# Patient Record
Sex: MALE | Race: WHITE | Employment: FULL TIME | URBAN - NONMETROPOLITAN AREA
[De-identification: names, ages, dates, MRNs, and addresses within clinical notes are randomized per-mention and may not be internally consistent; named-entity substitution may affect disease eponyms.]

---

## 2023-12-06 ENCOUNTER — OFFICE VISIT (OUTPATIENT)
Age: 68
End: 2023-12-06
Payer: COMMERCIAL

## 2023-12-06 VITALS
SYSTOLIC BLOOD PRESSURE: 130 MMHG | HEIGHT: 74 IN | HEART RATE: 87 BPM | WEIGHT: 186.8 LBS | BODY MASS INDEX: 23.97 KG/M2 | DIASTOLIC BLOOD PRESSURE: 62 MMHG

## 2023-12-06 DIAGNOSIS — Z79.4 TYPE 2 DIABETES MELLITUS WITH HYPERGLYCEMIA, WITH LONG-TERM CURRENT USE OF INSULIN (HCC): Primary | ICD-10-CM

## 2023-12-06 DIAGNOSIS — E11.65 TYPE 2 DIABETES MELLITUS WITH HYPERGLYCEMIA, WITH LONG-TERM CURRENT USE OF INSULIN (HCC): Primary | ICD-10-CM

## 2023-12-06 DIAGNOSIS — E78.2 MIXED HYPERLIPIDEMIA: ICD-10-CM

## 2023-12-06 PROCEDURE — 1123F ACP DISCUSS/DSCN MKR DOCD: CPT | Performed by: INTERNAL MEDICINE

## 2023-12-06 PROCEDURE — 99204 OFFICE O/P NEW MOD 45 MIN: CPT | Performed by: INTERNAL MEDICINE

## 2023-12-06 RX ORDER — FAMOTIDINE 40 MG/1
40 TABLET, FILM COATED ORAL
COMMUNITY
Start: 2023-02-17

## 2023-12-06 RX ORDER — EVOLOCUMAB 140 MG/ML
140 INJECTION, SOLUTION SUBCUTANEOUS
COMMUNITY
Start: 2023-07-24

## 2023-12-06 RX ORDER — PRASUGREL 10 MG/1
10 TABLET, FILM COATED ORAL DAILY
COMMUNITY
Start: 2023-11-22

## 2023-12-06 RX ORDER — TADALAFIL 5 MG/1
TABLET ORAL
COMMUNITY
Start: 2023-06-19

## 2023-12-06 RX ORDER — EMPAGLIFLOZIN 25 MG/1
25 TABLET, FILM COATED ORAL DAILY
COMMUNITY
Start: 2023-11-10

## 2023-12-06 RX ORDER — FLUTICASONE PROPIONATE 50 MCG
2 SPRAY, SUSPENSION (ML) NASAL DAILY PRN
COMMUNITY
Start: 2023-02-17

## 2023-12-06 NOTE — PROGRESS NOTES
Doris Bruner is a 77 y.o. , male who comes for Initial visit for Diabetes Mellitus Type 2  PCP: Anibal Wang RN    HPI   This patient was diagnosed with diabetes mellitus 13 years ago. Current therapy includes LANTUS 30 units  AND metformin 1000/1000 and jardiance 25 mg daily    Nutritional plan:  portion control and restriction of carbs  Exercise Plan  cardiac rehab at the moment. Usually from ADLs. The patient is checking blood sugar 4x times/day using dexom CGMS. His average blood glucose is 145 mg/dL with a glucose management indicator of 6.8%. The patient was within the target range 76% of the time. Hypoglycemia is extremely uncommon. The patient does recognize hypoglycemia. There is no history of severe hypoglycemia. Most recent A1c is 6.6 from 6 weeks ago. Diabetic symptoms: Polyuria   He is not current on eye exam, and He reports no foot ulcers or infections. He is s/p fusion of left PTP joint  Patient isbeing treated for hypercholesterolemia. Patient is being treated for  hypertension. Past Medical History:   Diagnosis Date    Diabetes mellitus ([address])     Pancreatitis       History reviewed. No pertinent surgical history. History reviewed. No pertinent family history. Social History     Tobacco Use    Smoking status: Former     Packs/day: 1     Types: Cigarettes     Quit date: 8/21/2013     Years since quitting: 10.2    Smokeless tobacco: Never   Substance Use Topics    Alcohol use: Yes     Comment: Socially      Current Outpatient Medications   Medication Sig Dispense Refill    Evolocumab (REPATHA SURECLICK) 272 MG/ML SOAJ Inject 140 mg into the skin every 14 days      famotidine (PEPCID) 40 MG tablet Take 1 tablet by mouth      fluticasone (FLONASE) 50 MCG/ACT nasal spray 2 sprays by Nasal route daily as needed      tadalafil (CIALIS) 5 MG tablet Take 1 tab (5 mg) twice daily.       JARDIANCE 25 MG tablet Take 1 tablet by mouth daily      prasugrel (EFFIENT) 10 MG TABS Take 1

## 2024-02-22 ENCOUNTER — HOSPITAL ENCOUNTER (EMERGENCY)
Age: 69
Discharge: HOME | End: 2024-02-22
Attending: FAMILY MEDICINE
Payer: OTHER GOVERNMENT

## 2024-02-22 VITALS
WEIGHT: 185 LBS | TEMPERATURE: 97.6 F | HEIGHT: 74 IN | HEART RATE: 62 BPM | DIASTOLIC BLOOD PRESSURE: 82 MMHG | RESPIRATION RATE: 16 BRPM | SYSTOLIC BLOOD PRESSURE: 130 MMHG | BODY MASS INDEX: 23.74 KG/M2 | OXYGEN SATURATION: 98 %

## 2024-02-22 DIAGNOSIS — M54.2 NECK PAIN: Primary | ICD-10-CM

## 2024-02-22 DIAGNOSIS — M25.519 ACUTE SHOULDER PAIN, UNSPECIFIED LATERALITY: ICD-10-CM

## 2024-02-22 LAB
ANION GAP SERPL CALC-SCNC: 14 MEQ/L (ref 8–16)
BASOPHILS ABSOLUTE: 0 THOU/MM3 (ref 0–0.1)
BASOPHILS NFR BLD AUTO: 0.7 %
BUN SERPL-MCNC: 15 MG/DL (ref 7–22)
CALCIUM SERPL-MCNC: 10 MG/DL (ref 8.5–10.5)
CHLORIDE SERPL-SCNC: 98 MEQ/L (ref 98–111)
CO2 SERPL-SCNC: 26 MEQ/L (ref 23–33)
CREAT SERPL-MCNC: 0.7 MG/DL (ref 0.4–1.2)
DEPRECATED RDW RBC AUTO: 41.2 FL (ref 35–45)
EKG ATRIAL RATE: 64 BPM
EKG P AXIS: 56 DEGREES
EKG P-R INTERVAL: 178 MS
EKG Q-T INTERVAL: 414 MS
EKG QRS DURATION: 96 MS
EKG QTC CALCULATION (BAZETT): 427 MS
EKG R AXIS: -24 DEGREES
EKG T AXIS: 16 DEGREES
EKG VENTRICULAR RATE: 64 BPM
EOSINOPHIL NFR BLD AUTO: 3.7 %
EOSINOPHILS ABSOLUTE: 0.2 THOU/MM3 (ref 0–0.4)
ERYTHROCYTE [DISTWIDTH] IN BLOOD BY AUTOMATED COUNT: 11.7 % (ref 11.5–14.5)
GFR SERPL CREATININE-BSD FRML MDRD: > 60 ML/MIN/1.73M2
GLUCOSE SERPL-MCNC: 139 MG/DL (ref 70–108)
HCT VFR BLD AUTO: 40.5 % (ref 42–52)
HGB BLD-MCNC: 13 GM/DL (ref 14–18)
IMM GRANULOCYTES # BLD AUTO: 0.01 THOU/MM3 (ref 0–0.07)
IMM GRANULOCYTES NFR BLD AUTO: 0.2 %
LYMPHOCYTES ABSOLUTE: 0.9 THOU/MM3 (ref 1–4.8)
LYMPHOCYTES NFR BLD AUTO: 16.5 %
MCH RBC QN AUTO: 30.9 PG (ref 26–33)
MCHC RBC AUTO-ENTMCNC: 32.1 GM/DL (ref 32.2–35.5)
MCV RBC AUTO: 96.2 FL (ref 80–94)
MONOCYTES ABSOLUTE: 0.6 THOU/MM3 (ref 0.4–1.3)
MONOCYTES NFR BLD AUTO: 9.8 %
NEUTROPHILS NFR BLD AUTO: 69.1 %
NRBC BLD AUTO-RTO: 0 /100 WBC
OSMOLALITY SERPL CALC.SUM OF ELEC: 278.8 MOSMOL/KG (ref 275–300)
PLATELET # BLD AUTO: 236 THOU/MM3 (ref 130–400)
PMV BLD AUTO: 8.7 FL (ref 9.4–12.4)
POTASSIUM SERPL-SCNC: 4 MEQ/L (ref 3.5–5.2)
RBC # BLD AUTO: 4.21 MILL/MM3 (ref 4.7–6.1)
SEGMENTED NEUTROPHILS ABSOLUTE COUNT: 3.9 THOU/MM3 (ref 1.8–7.7)
SODIUM SERPL-SCNC: 138 MEQ/L (ref 135–145)
TROPONIN, HIGH SENSITIVITY: 12 NG/L (ref 0–12)
TROPONIN, HIGH SENSITIVITY: 14 NG/L (ref 0–12)
WBC # BLD AUTO: 5.7 THOU/MM3 (ref 4.8–10.8)

## 2024-02-22 PROCEDURE — 99284 EMERGENCY DEPT VISIT MOD MDM: CPT

## 2024-02-22 PROCEDURE — 85025 COMPLETE CBC W/AUTO DIFF WBC: CPT

## 2024-02-22 PROCEDURE — 93005 ELECTROCARDIOGRAM TRACING: CPT | Performed by: STUDENT IN AN ORGANIZED HEALTH CARE EDUCATION/TRAINING PROGRAM

## 2024-02-22 PROCEDURE — 96372 THER/PROPH/DIAG INJ SC/IM: CPT

## 2024-02-22 PROCEDURE — 84484 ASSAY OF TROPONIN QUANT: CPT

## 2024-02-22 PROCEDURE — 6360000002 HC RX W HCPCS: Performed by: STUDENT IN AN ORGANIZED HEALTH CARE EDUCATION/TRAINING PROGRAM

## 2024-02-22 PROCEDURE — 96374 THER/PROPH/DIAG INJ IV PUSH: CPT

## 2024-02-22 PROCEDURE — 80048 BASIC METABOLIC PNL TOTAL CA: CPT

## 2024-02-22 PROCEDURE — 36415 COLL VENOUS BLD VENIPUNCTURE: CPT

## 2024-02-22 RX ORDER — ORPHENADRINE CITRATE 30 MG/ML
60 INJECTION INTRAMUSCULAR; INTRAVENOUS ONCE
Status: COMPLETED | OUTPATIENT
Start: 2024-02-22 | End: 2024-02-22

## 2024-02-22 RX ORDER — METHYLPREDNISOLONE 4 MG/1
TABLET ORAL
Qty: 1 KIT | Refills: 0 | Status: SHIPPED | OUTPATIENT
Start: 2024-02-22 | End: 2024-02-28

## 2024-02-22 RX ADMIN — ORPHENADRINE CITRATE 60 MG: 60 INJECTION INTRAMUSCULAR; INTRAVENOUS at 07:08

## 2024-02-22 RX ADMIN — HYDROMORPHONE HYDROCHLORIDE 1 MG: 1 INJECTION, SOLUTION INTRAMUSCULAR; INTRAVENOUS; SUBCUTANEOUS at 07:08

## 2024-02-22 ASSESSMENT — PAIN - FUNCTIONAL ASSESSMENT
PAIN_FUNCTIONAL_ASSESSMENT: 0-10

## 2024-02-22 ASSESSMENT — PAIN DESCRIPTION - ORIENTATION
ORIENTATION: LEFT
ORIENTATION: LEFT

## 2024-02-22 ASSESSMENT — PAIN SCALES - GENERAL
PAINLEVEL_OUTOF10: 6
PAINLEVEL_OUTOF10: 10
PAINLEVEL_OUTOF10: 10
PAINLEVEL_OUTOF10: 7

## 2024-02-22 ASSESSMENT — PAIN DESCRIPTION - LOCATION
LOCATION: NECK;SHOULDER
LOCATION: SHOULDER;NECK
LOCATION: NECK

## 2024-02-22 ASSESSMENT — PAIN DESCRIPTION - FREQUENCY: FREQUENCY: CONTINUOUS

## 2024-02-22 NOTE — ED NOTES
Pt reassessed at this time. Rates pain 6/10 at this time. Vitals stable with telemetry in place. Call light in reach
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
## 2024-02-22 NOTE — DISCHARGE INSTRUCTIONS
You are seen today in the emergency department for left-sided shoulder pain.    Your cardiac workup was negative.  Your pain most likely secondary to your recent surgery as well as trauma.    We are sending you home on a Medrol Dosepak to help with inflammation and pain.    Follow-up with your surgeon regarding today visit.

## 2024-03-20 ENCOUNTER — CLINICAL DOCUMENTATION (OUTPATIENT)
Age: 69
End: 2024-03-20

## 2024-03-20 ENCOUNTER — TELEPHONE (OUTPATIENT)
Age: 69
End: 2024-03-20

## 2024-03-20 DIAGNOSIS — Z79.4 TYPE 2 DIABETES MELLITUS WITH HYPERGLYCEMIA, WITH LONG-TERM CURRENT USE OF INSULIN (HCC): Primary | ICD-10-CM

## 2024-03-20 DIAGNOSIS — Z79.4 TYPE 2 DIABETES MELLITUS WITH HYPERGLYCEMIA, WITH LONG-TERM CURRENT USE OF INSULIN (HCC): ICD-10-CM

## 2024-03-20 DIAGNOSIS — E11.65 TYPE 2 DIABETES MELLITUS WITH HYPERGLYCEMIA, WITH LONG-TERM CURRENT USE OF INSULIN (HCC): ICD-10-CM

## 2024-03-20 DIAGNOSIS — E11.65 TYPE 2 DIABETES MELLITUS WITH HYPERGLYCEMIA, WITH LONG-TERM CURRENT USE OF INSULIN (HCC): Primary | ICD-10-CM

## 2024-03-20 RX ORDER — INSULIN LISPRO 100 [IU]/ML
INJECTION, SOLUTION INTRAVENOUS; SUBCUTANEOUS
Qty: 5 ADJUSTABLE DOSE PRE-FILLED PEN SYRINGE | Refills: 5 | Status: SHIPPED | OUTPATIENT
Start: 2024-03-20 | End: 2024-03-20 | Stop reason: CLARIF

## 2024-03-20 RX ORDER — ACYCLOVIR 400 MG/1
TABLET ORAL
Qty: 9 EACH | Refills: 3 | Status: SHIPPED | OUTPATIENT
Start: 2024-03-20

## 2024-03-20 RX ORDER — INSULIN ASPART 100 [IU]/ML
INJECTION, SOLUTION INTRAVENOUS; SUBCUTANEOUS
Qty: 5 ADJUSTABLE DOSE PRE-FILLED PEN SYRINGE | Refills: 5 | Status: SHIPPED | OUTPATIENT
Start: 2024-03-20 | End: 2024-03-21 | Stop reason: SDUPTHER

## 2024-03-20 NOTE — PROGRESS NOTES
I sent in prescriptions for insulin and pen needles as well as sensors.  [facility] [place]

## 2024-03-20 NOTE — TELEPHONE ENCOUNTER
[facility] called in and they do not have humalog and would like to know if this can be changed to novolog, if so can you please send novolog infor this patient

## 2024-03-20 NOTE — TELEPHONE ENCOUNTER
[facility] called in stating that the sig needs to state how many times a day the patient is to take the novolog. Please advise  [phone]

## 2024-03-21 DIAGNOSIS — Z79.4 TYPE 2 DIABETES MELLITUS WITH HYPERGLYCEMIA, WITH LONG-TERM CURRENT USE OF INSULIN (HCC): ICD-10-CM

## 2024-03-21 DIAGNOSIS — E11.65 TYPE 2 DIABETES MELLITUS WITH HYPERGLYCEMIA, WITH LONG-TERM CURRENT USE OF INSULIN (HCC): ICD-10-CM

## 2024-03-21 RX ORDER — INSULIN ASPART 100 [IU]/ML
INJECTION, SOLUTION INTRAVENOUS; SUBCUTANEOUS
Qty: 5 ADJUSTABLE DOSE PRE-FILLED PEN SYRINGE | Refills: 5 | Status: SHIPPED | OUTPATIENT
Start: 2024-03-21

## 2024-05-03 DIAGNOSIS — E11.65 TYPE 2 DIABETES MELLITUS WITH HYPERGLYCEMIA, WITH LONG-TERM CURRENT USE OF INSULIN (HCC): ICD-10-CM

## 2024-05-03 DIAGNOSIS — Z79.4 TYPE 2 DIABETES MELLITUS WITH HYPERGLYCEMIA, WITH LONG-TERM CURRENT USE OF INSULIN (HCC): ICD-10-CM

## 2024-05-06 RX ORDER — INSULIN ASPART 100 [IU]/ML
INJECTION, SOLUTION INTRAVENOUS; SUBCUTANEOUS
Qty: 5 ADJUSTABLE DOSE PRE-FILLED PEN SYRINGE | Refills: 5 | Status: SHIPPED | OUTPATIENT
Start: 2024-05-06

## 2024-05-22 ENCOUNTER — TELEPHONE (OUTPATIENT)
Age: 69
End: 2024-05-22

## 2024-05-22 DIAGNOSIS — E11.65 TYPE 2 DIABETES MELLITUS WITH HYPERGLYCEMIA, WITH LONG-TERM CURRENT USE OF INSULIN (HCC): Primary | ICD-10-CM

## 2024-05-22 DIAGNOSIS — Z79.4 TYPE 2 DIABETES MELLITUS WITH HYPERGLYCEMIA, WITH LONG-TERM CURRENT USE OF INSULIN (HCC): Primary | ICD-10-CM

## 2024-07-18 ENCOUNTER — TELEMEDICINE (OUTPATIENT)
Age: 69
End: 2024-07-18
Payer: OTHER GOVERNMENT

## 2024-07-18 DIAGNOSIS — E11.65 TYPE 2 DIABETES MELLITUS WITH HYPERGLYCEMIA, WITH LONG-TERM CURRENT USE OF INSULIN (HCC): Primary | ICD-10-CM

## 2024-07-18 DIAGNOSIS — Z79.4 TYPE 2 DIABETES MELLITUS WITH HYPERGLYCEMIA, WITH LONG-TERM CURRENT USE OF INSULIN (HCC): Primary | ICD-10-CM

## 2024-07-18 DIAGNOSIS — E78.2 MIXED HYPERLIPIDEMIA: ICD-10-CM

## 2024-07-18 PROCEDURE — 1123F ACP DISCUSS/DSCN MKR DOCD: CPT | Performed by: INTERNAL MEDICINE

## 2024-07-18 PROCEDURE — 99215 OFFICE O/P EST HI 40 MIN: CPT | Performed by: INTERNAL MEDICINE

## 2024-07-18 RX ORDER — LOSARTAN POTASSIUM 100 MG/1
100 TABLET ORAL
COMMUNITY
Start: 2023-12-14

## 2024-07-18 RX ORDER — ALIROCUMAB 150 MG/ML
INJECTION, SOLUTION SUBCUTANEOUS
Qty: 1 ML | Refills: 5 | Status: CANCELLED | OUTPATIENT
Start: 2024-07-18

## 2024-07-18 RX ORDER — METOPROLOL SUCCINATE 25 MG/1
20 TABLET, EXTENDED RELEASE ORAL DAILY
COMMUNITY

## 2024-07-18 NOTE — PROGRESS NOTES
Segundo Rahman, was evaluated through a synchronous (real-time) audio-video encounter. The patient (or guardian if applicable) is aware that this is a billable service, which includes applicable co-pays. This Virtual Visit was conducted with patient's (and/or legal guardian's) consent. Patient identification was verified, and a caregiver was present when appropriate.   The patient was located at Home: [address] [facility] [address] [facility] [address]  Provider was located at Facility (Appt Dept): [address] [facility] [address]  [place] [address]  Confirm you are appropriately licensed, registered, or certified to deliver care in the state where the patient is located as indicated above. If you are not or unsure, please re-schedule the visit: Yes, I confirm.     Segundo Rahman (:  1955) is a Established patient, presenting virtually for evaluation of the following:    Assessment & Plan   Below is the assessment and plan developed based on review of pertinent history, physical exam, labs, studies, and medications.  1. Type 2 diabetes mellitus with hyperglycemia, with long-term current use of insulin (HCC): Currently maintained on Jardiance and metformin.  The patient stopped insulin 1 month ago with excellent glycemic control.  He is very stringent with his diet.  Physical activity has not changed.  Continue to monitor blood sugars.  2. Mixed hyperlipidemia: Treated with Praluent which is well-tolerated.  To continue.    No follow-ups on file.       Subjective   HPI  68-year-old male here for follow-up for type 2 diabetes mellitus.  He was diagnosed with diabetes mellitus 13 years ago.  The patient is currently taking Jardiance 25 mg daily with metformin 1000/1000 he was previously on Lantus daily but has come off of it with no major problems.  The patient has modified his diet substantially, with significant improvement of blood sugar.  The patient has moved away from carbohydrates and in its place has increased the intake of

## 2024-08-26 ENCOUNTER — TELEPHONE (OUTPATIENT)
Age: 69
End: 2024-08-26

## 2024-08-31 ENCOUNTER — CLINICAL DOCUMENTATION (OUTPATIENT)
Age: 69
End: 2024-08-31

## 2024-08-31 DIAGNOSIS — Z79.4 TYPE 2 DIABETES MELLITUS WITH HYPERGLYCEMIA, WITH LONG-TERM CURRENT USE OF INSULIN (HCC): Primary | ICD-10-CM

## 2024-08-31 DIAGNOSIS — E11.65 TYPE 2 DIABETES MELLITUS WITH HYPERGLYCEMIA, WITH LONG-TERM CURRENT USE OF INSULIN (HCC): Primary | ICD-10-CM

## 2024-09-03 LAB
ESTIMATED AVERAGE GLUCOSE: 137
HBA1C MFR BLD: 6.4 %

## 2024-09-24 ENCOUNTER — TELEPHONE (OUTPATIENT)
Dept: RHEUMATOLOGY | Age: 69
End: 2024-09-24

## 2024-09-24 NOTE — TELEPHONE ENCOUNTER
Anai called patient to cancel his new pt appt today due to Dr Escobar with family emergency and leaving for the day. Patient upset about sudden cancel and she advised would call with new date once we speak with Luz.     Patient called back and I spoke with him. 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

## 2024-10-01 ASSESSMENT — RHEUMATOLOGY NEW PATIENT QUESTIONNAIRE
BLOOD IN STOOLS: N
LOSS OF CONSCIOUSNESS: N
UNUSUALLY RAPID OR SLOWED HEART RATE: N
DEPRESSION: N
STOMACH PAIN: N
EYE PAIN: N
BEHAVIORAL CHANGES: N
DIFFICULTY FALLING ASLEEP: N
SEIZURES: N
DIFFICULTY SWALLOWING: N
INCREASED SUSCEPTIBILITY TO INFECTION: N
ABNORMAL URINE: N
NAUSEA: N
MORNING STIFFNESS IN LOWER BACK: Y
LOSS OF VISION: N
NIGHT SWEATS: Y
BLACK STOOLS: N
COUGH: N
PAIN OR BURNING ON URINATION: N
RASH: N
VOMITING OF BLOOD OR COFFEE GROUND CONSISTENCY MATERIAL: N
ANXIETY: N
UNUSUAL FATIGUE: N
MORNING STIFFNESS: Y
PERSISTENT DIARRHEA: N
MEMORY LOSS: N
EYE REDNESS: N
HEARTBURN OR REFLUX: N
HEADACHES: N
SWOLLEN OR TENDER GLANDS: N
COLOR CHANGES OF HANDS OR FEET IN THE COLD: N
DIFFICULTY STAYING ASLEEP: N
DRYNESS OF MOUTH: N
RASH OR ULCERS: N
JAUNDICE: N
CHEST PAIN: N
FAINTING: N
HOW WOULD YOU DESCRIBE YOUR STIFFNESS ON AVERAGE: SEVERE
SKIN TIGHTNESS: N
DOUBLE OR BLURRED VISION: N
UNEXPLAINED WEIGHT CHANGE: N
SUN SENSITIVE (SUN ALLERGY): N
UNEXPLAINED HEARING LOSS: N
MUSCLE WEAKNESS: N
FEVER: N
HOARSE VOICE: N
EASILY LOSING TEMPER: N
SWOLLEN LEGS OR FEET: N
NODULES/BUMPS: N
SKIN REDNESS: N
AGITATION: N
DIFFICULTY BREATHING LYING DOWN: N
EXCESSIVE HAIR LOSS (MORE THAN YOUR NORM): N
JOINT PAIN: Y
NUMBNESS OR TINGLING IN HANDS OR FEET: N
UNUSUAL BLEEDING: N
SHORTNESS OF BREATH: N
EYE DRYNESS: N
JOINT SWELLING: N
SORES IN MOUTH OR NOSE: N
ANEMIA: N
EASY BRUISING: Y

## 2024-10-02 NOTE — PROGRESS NOTES
06:34 AM    HGB 13.0 02/22/2024 06:34 AM    HCT 40.5 02/22/2024 06:34 AM    MCV 96.2 02/22/2024 06:34 AM    MCH 30.9 02/22/2024 06:34 AM    MCHC 32.1 02/22/2024 06:34 AM     02/22/2024 06:34 AM    MPV 8.7 02/22/2024 06:34 AM       CMP  Lab Results   Component Value Date/Time    CALCIUM 10.0 02/22/2024 06:34 AM     02/22/2024 06:34 AM    K 4.0 02/22/2024 06:34 AM    CO2 26 02/22/2024 06:34 AM    CL 98 02/22/2024 06:34 AM    BUN 15 02/22/2024 06:34 AM    CREATININE 0.7 02/22/2024 06:34 AM       HgBA1c: No components found for: \"HGBA1C\"    No results found for: \"TSHFT4\", \"TSH\"  No results found for: \"VITD25\"      No results found for: \"ANASCRN\"  No results found for: \"SSA\"  No results found for: \"SSB\"  No results found for: \"ANTI-SMITH\"  No results found for: \"DSDNAAB\"   No results found for: \"ANTIRNP\"  No results found for: \"C3\", \"C4\"  No results found for: \"CCPAB\"  No results found for: \"RF\"    No components found for: \"CANCASCRN\", \"APANCASCRN\"  No results found for: \"SEDRATE\"  No results found for: \"CRP\"    RADIOLOGY:   MRI shoulder April 2024:   IMPRESSION:   1. There is no full-thickness rotator cuff tear.   2.  There is edema within the rotator cuff interval and the distal supraspinatus and infraspinatus tendons. Findings consistent with severe tendinosis or partial thickness intrasubstance tear.   3.  There is a 8 mm T1 and T2 hypointense structure inferior to the glenoid suggesting hydroxyapatite deposition disease. This appears to be extra-articular.     Assessment & Plan     1. Polyarthralgia    2. Chronic midline low back pain with bilateral sciatica    3. Dupuytren contracture    4. Chronic neck pain    5. Left median nerve neuropathy    6. Coronary artery disease involving native coronary artery of native heart without angina pectoris        Polyarthralgia   Chronic neck pain   Chronic low back pain with radiculopathy and + am stiffness.   Dupetron contractures.   Can be related to T2DM and

## 2024-10-04 ENCOUNTER — HOSPITAL ENCOUNTER (OUTPATIENT)
Dept: MRI IMAGING | Age: 69
Discharge: HOME | End: 2024-10-04
Attending: RADIOLOGY

## 2024-10-04 ENCOUNTER — HOSPITAL ENCOUNTER (OUTPATIENT)
Dept: GENERAL RADIOLOGY | Age: 69
Discharge: HOME | End: 2024-10-04

## 2024-10-04 ENCOUNTER — OFFICE VISIT (OUTPATIENT)
Dept: RHEUMATOLOGY | Age: 69
End: 2024-10-04
Payer: OTHER GOVERNMENT

## 2024-10-04 VITALS
DIASTOLIC BLOOD PRESSURE: 70 MMHG | HEART RATE: 66 BPM | OXYGEN SATURATION: 97 % | BODY MASS INDEX: 22.46 KG/M2 | WEIGHT: 175 LBS | SYSTOLIC BLOOD PRESSURE: 132 MMHG | HEIGHT: 74 IN

## 2024-10-04 DIAGNOSIS — Z00.6 EXAMINATION FOR NORMAL COMPARISON OR CONTROL IN CLINICAL RESEARCH: ICD-10-CM

## 2024-10-04 DIAGNOSIS — M54.41 CHRONIC MIDLINE LOW BACK PAIN WITH BILATERAL SCIATICA: ICD-10-CM

## 2024-10-04 DIAGNOSIS — M54.2 CHRONIC NECK PAIN: ICD-10-CM

## 2024-10-04 DIAGNOSIS — M25.50 POLYARTHRALGIA: Primary | ICD-10-CM

## 2024-10-04 DIAGNOSIS — G89.29 CHRONIC NECK PAIN: ICD-10-CM

## 2024-10-04 DIAGNOSIS — M72.0 DUPUYTREN CONTRACTURE: ICD-10-CM

## 2024-10-04 DIAGNOSIS — G89.29 CHRONIC MIDLINE LOW BACK PAIN WITH BILATERAL SCIATICA: ICD-10-CM

## 2024-10-04 DIAGNOSIS — M54.42 CHRONIC MIDLINE LOW BACK PAIN WITH BILATERAL SCIATICA: ICD-10-CM

## 2024-10-04 PROBLEM — G56.12 LEFT MEDIAN NERVE NEUROPATHY: Status: ACTIVE | Noted: 2024-10-04

## 2024-10-04 PROBLEM — I25.10 CORONARY ARTERY DISEASE INVOLVING NATIVE CORONARY ARTERY OF NATIVE HEART WITHOUT ANGINA PECTORIS: Status: ACTIVE | Noted: 2024-10-04

## 2024-10-04 PROCEDURE — 1123F ACP DISCUSS/DSCN MKR DOCD: CPT | Performed by: INTERNAL MEDICINE

## 2024-10-04 PROCEDURE — 99204 OFFICE O/P NEW MOD 45 MIN: CPT | Performed by: INTERNAL MEDICINE

## 2024-10-04 ASSESSMENT — ENCOUNTER SYMPTOMS
EYES NEGATIVE: 1
RESPIRATORY NEGATIVE: 1
GASTROINTESTINAL NEGATIVE: 1

## 2024-10-21 LAB
C-REACTIVE PROTEIN: 3
CERULOPLASMIN: 19
MAGNESIUM: 1.9 MG/DL
PHOSPHORUS: 4.1 MG/DL
TOTAL IRON BINDING CAPACITY: 407
URIC ACID: 4.1

## 2024-10-25 ENCOUNTER — HOSPITAL ENCOUNTER (OUTPATIENT)
Dept: GENERAL RADIOLOGY | Age: 69
Discharge: HOME | End: 2024-10-25

## 2024-10-25 DIAGNOSIS — G89.29 CHRONIC MIDLINE LOW BACK PAIN WITH BILATERAL SCIATICA: ICD-10-CM

## 2024-10-25 DIAGNOSIS — M54.42 CHRONIC MIDLINE LOW BACK PAIN WITH BILATERAL SCIATICA: ICD-10-CM

## 2024-10-25 DIAGNOSIS — M72.0 DUPUYTREN CONTRACTURE: ICD-10-CM

## 2024-10-25 DIAGNOSIS — Z00.6 EXAMINATION FOR NORMAL COMPARISON OR CONTROL IN CLINICAL RESEARCH: ICD-10-CM

## 2024-10-25 DIAGNOSIS — M25.50 POLYARTHRALGIA: ICD-10-CM

## 2024-10-25 DIAGNOSIS — M54.41 CHRONIC MIDLINE LOW BACK PAIN WITH BILATERAL SCIATICA: ICD-10-CM

## 2024-11-14 DIAGNOSIS — Z79.4 TYPE 2 DIABETES MELLITUS WITH HYPERGLYCEMIA, WITH LONG-TERM CURRENT USE OF INSULIN (HCC): ICD-10-CM

## 2024-11-14 DIAGNOSIS — E11.65 TYPE 2 DIABETES MELLITUS WITH HYPERGLYCEMIA, WITH LONG-TERM CURRENT USE OF INSULIN (HCC): ICD-10-CM

## 2024-11-15 RX ORDER — ACYCLOVIR 400 MG/1
TABLET ORAL
Qty: 9 EACH | Refills: 3 | Status: SHIPPED | OUTPATIENT
Start: 2024-11-15

## 2024-12-17 ENCOUNTER — OFFICE VISIT (OUTPATIENT)
Age: 69
End: 2024-12-17
Payer: OTHER GOVERNMENT

## 2024-12-17 VITALS
SYSTOLIC BLOOD PRESSURE: 126 MMHG | WEIGHT: 180 LBS | DIASTOLIC BLOOD PRESSURE: 76 MMHG | BODY MASS INDEX: 23.1 KG/M2 | HEIGHT: 74 IN

## 2024-12-17 DIAGNOSIS — M25.50 POLYARTHRALGIA: Primary | ICD-10-CM

## 2024-12-17 DIAGNOSIS — M54.41 CHRONIC MIDLINE LOW BACK PAIN WITH BILATERAL SCIATICA: ICD-10-CM

## 2024-12-17 DIAGNOSIS — G89.29 CHRONIC MIDLINE LOW BACK PAIN WITH BILATERAL SCIATICA: ICD-10-CM

## 2024-12-17 DIAGNOSIS — M54.42 CHRONIC MIDLINE LOW BACK PAIN WITH BILATERAL SCIATICA: ICD-10-CM

## 2024-12-17 PROCEDURE — 1123F ACP DISCUSS/DSCN MKR DOCD: CPT | Performed by: INTERNAL MEDICINE

## 2024-12-17 PROCEDURE — 99213 OFFICE O/P EST LOW 20 MIN: CPT | Performed by: INTERNAL MEDICINE

## 2024-12-17 PROCEDURE — PBSHW PBB SHADOW CHARGE: Performed by: INTERNAL MEDICINE

## 2024-12-17 RX ORDER — SULFASALAZINE 500 MG/1
TABLET ORAL
Qty: 98 TABLET | Refills: 0 | Status: SHIPPED | OUTPATIENT
Start: 2024-12-17 | End: 2025-01-14

## 2024-12-17 ASSESSMENT — ENCOUNTER SYMPTOMS
EYES NEGATIVE: 1
RESPIRATORY NEGATIVE: 1
GASTROINTESTINAL NEGATIVE: 1

## 2024-12-17 NOTE — PROGRESS NOTES
lateral recess stenosis. No neuroforaminal stenosis. Facet joints are intact.     L2-3: There is moderate disc height loss with disc desiccation and generalized disc bulging. There is spinal stenosis due to disc bulging and ligamentum flavum hypertrophy with of effacement of the nerve roots. There is a moderate right and moderate to severe left neuroforaminal stenosis. Facet joints are intact.     L3-4: Normal disc signal without height loss or disc displacement.  No central canal or lateral recess stenosis. No neuroforaminal stenosis. Facet joints are intact.     L4-5: Normal disc signal without height loss or disc displacement.  No central canal or lateral recess stenosis. No neuroforaminal stenosis. Facet joints are intact.     L5-S1: There is disc desiccation with disc generalized bulging. No central canal or lateral recess stenosis. No neuroforaminal stenosis. There is facet subchondral sclerosis.     IMPRESSION:   IMPRESSION:   Degenerative disc disease most prominent at L2-3 with spinal and neuroforaminal stenosis.       Assessment & Plan     No diagnosis found.      Polyarthralgia   Chronic neck pain   Chronic low back pain with radiculopathy and + am stiffness.   Dupetron contractures.   Medication monitoring   Dupuytren's contractures bilateral hands    - request mri lumbar spine from [facility]  - suspected inflammatory arthritis -- trial of Sulfasalazine w/ titration of 1000mg twice daily to help with th ehand and wrist pain, morning stiffness, and erosion noted along the right 5th matacarpal joint, prior shoulder effusion on MRI   - labs q 4 weeks with Sulfasalazine start   -Scheduled for evaluation by orthopedic hand surgeon in February 2024.                No follow-ups on file.    Electronically signed by KYLE GLASER DO on 12/17/2024 at 3:15 PM    New Prescriptions    No medications on file       12/17/2024       The risks and benefits of my recommendations, as well as other treatment

## 2024-12-20 ENCOUNTER — TELEPHONE (OUTPATIENT)
Age: 69
End: 2024-12-20

## 2024-12-20 NOTE — TELEPHONE ENCOUNTER
----- Message from Dr. Emmanuel Escobar, DO sent at 12/20/2024  7:57 AM EST -----  Please request the mri lumbar spine imaging to be pushed over from [facility] .  This will then need to be managed by our radiology department to the patient's chart.    Please let me know when the MRI is available to be viewed.

## 2024-12-20 NOTE — TELEPHONE ENCOUNTER
Attempted to contact [facility] 3 x. The phone does not  ring on the other end and hangs up. Will try again.

## 2024-12-26 ENCOUNTER — HOSPITAL ENCOUNTER (OUTPATIENT)
Dept: MRI IMAGING | Age: 69
Discharge: HOME | End: 2024-12-26
Attending: RADIOLOGY

## 2024-12-26 DIAGNOSIS — Z00.6 EXAMINATION FOR NORMAL COMPARISON OR CONTROL IN CLINICAL RESEARCH: ICD-10-CM

## 2025-01-27 DIAGNOSIS — E11.65 TYPE 2 DIABETES MELLITUS WITH HYPERGLYCEMIA, WITH LONG-TERM CURRENT USE OF INSULIN (HCC): ICD-10-CM

## 2025-01-27 DIAGNOSIS — M54.42 CHRONIC MIDLINE LOW BACK PAIN WITH BILATERAL SCIATICA: ICD-10-CM

## 2025-01-27 DIAGNOSIS — M54.41 CHRONIC MIDLINE LOW BACK PAIN WITH BILATERAL SCIATICA: ICD-10-CM

## 2025-01-27 DIAGNOSIS — Z79.4 TYPE 2 DIABETES MELLITUS WITH HYPERGLYCEMIA, WITH LONG-TERM CURRENT USE OF INSULIN (HCC): ICD-10-CM

## 2025-01-27 DIAGNOSIS — G89.29 CHRONIC MIDLINE LOW BACK PAIN WITH BILATERAL SCIATICA: ICD-10-CM

## 2025-01-27 DIAGNOSIS — M25.50 POLYARTHRALGIA: ICD-10-CM

## 2025-01-27 RX ORDER — SULFASALAZINE 500 MG/1
TABLET ORAL
Qty: 98 TABLET | Refills: 0 | OUTPATIENT
Start: 2025-01-27 | End: 2025-02-23

## 2025-01-27 NOTE — TELEPHONE ENCOUNTER
Segundo is requesting a refill of their   Requested Prescriptions     Pending Prescriptions Disp Refills    sulfaSALAzine (AZULFIDINE) 500 MG tablet 98 tablet 0     Sig: Take 1 tablet by mouth 2 times daily for 7 days, THEN 2 tablets 2 times daily for 21 days.   . Please advise.      Last Appt:  Visit date not found  Next Appt:   Visit date not found  Preferred pharmacy:   [facility] - [place] [address] [address] [phone] - F [phone]

## 2025-01-28 RX ORDER — ACYCLOVIR 400 MG/1
TABLET ORAL
Qty: 9 EACH | Refills: 1 | Status: SHIPPED | OUTPATIENT
Start: 2025-01-28

## 2025-01-28 RX ORDER — EMPAGLIFLOZIN 25 MG/1
25 TABLET, FILM COATED ORAL DAILY
Qty: 30 TABLET | Refills: 5 | Status: SHIPPED | OUTPATIENT
Start: 2025-01-28

## 2025-02-13 DIAGNOSIS — Z79.4 TYPE 2 DIABETES MELLITUS WITH HYPERGLYCEMIA, WITH LONG-TERM CURRENT USE OF INSULIN (HCC): ICD-10-CM

## 2025-02-13 DIAGNOSIS — E11.65 TYPE 2 DIABETES MELLITUS WITH HYPERGLYCEMIA, WITH LONG-TERM CURRENT USE OF INSULIN (HCC): ICD-10-CM

## 2025-03-07 ENCOUNTER — TELEPHONE (OUTPATIENT)
Age: 70
End: 2025-03-07

## 2025-03-07 LAB
ALBUMIN: 4.9 G/DL
ALP BLD-CCNC: 61 U/L
ALT SERPL-CCNC: 13 U/L
ANION GAP SERPL CALCULATED.3IONS-SCNC: NORMAL MMOL/L
AST SERPL-CCNC: 14 U/L
BASOPHILS ABSOLUTE: 53 /ΜL
BASOPHILS RELATIVE PERCENT: 0.7 %
BILIRUB SERPL-MCNC: 0.3 MG/DL (ref 0.1–1.4)
BUN BLDV-MCNC: 27 MG/DL
C-REACTIVE PROTEIN: 3
CALCIUM SERPL-MCNC: 9.8 MG/DL
CHLORIDE BLD-SCNC: 102 MMOL/L
CO2: 28 MMOL/L
CREAT SERPL-MCNC: 0.94 MG/DL
EOSINOPHILS ABSOLUTE: 99 /ΜL
EOSINOPHILS RELATIVE PERCENT: 1.3 %
GFR, ESTIMATED: 87
GLUCOSE BLD-MCNC: 116 MG/DL
HCT VFR BLD CALC: 42.1 % (ref 41–53)
HEMOGLOBIN: 13.7 G/DL (ref 13.5–17.5)
LYMPHOCYTES ABSOLUTE: 1406 /ΜL
LYMPHOCYTES RELATIVE PERCENT: 18.5 %
MCH RBC QN AUTO: 30.5 PG
MCHC RBC AUTO-ENTMCNC: 32.5 G/DL
MCV RBC AUTO: 93.8 FL
MONOCYTES ABSOLUTE: 851 /ΜL
MONOCYTES RELATIVE PERCENT: 11.2 %
NEUTROPHILS ABSOLUTE: 5191 /ΜL
NEUTROPHILS RELATIVE PERCENT: 68.3 %
PDW BLD-RTO: 11.5 %
PLATELET # BLD: 311 K/ΜL
PMV BLD AUTO: 9.3 FL
POTASSIUM SERPL-SCNC: 4.6 MMOL/L
RBC # BLD: 4.49 10^6/ΜL
SED RATE, AUTOMATED: 2
SODIUM BLD-SCNC: 141 MMOL/L
TOTAL PROTEIN: 7.4 G/DL (ref 6.4–8.2)
WBC # BLD: 7.6 10^3/ML

## 2025-03-07 NOTE — TELEPHONE ENCOUNTER
Patient calling in for his refill of sulfasalazine.  He will get his labs done today 3/7/2025 at [facility].  He is asking for the refill to be for 90 day supplies if possible, sent to [facility].  Please advise.

## 2025-03-10 ENCOUNTER — RESULTS FOLLOW-UP (OUTPATIENT)
Age: 70
End: 2025-03-10

## 2025-03-10 DIAGNOSIS — M54.41 CHRONIC MIDLINE LOW BACK PAIN WITH BILATERAL SCIATICA: ICD-10-CM

## 2025-03-10 DIAGNOSIS — G89.29 CHRONIC MIDLINE LOW BACK PAIN WITH BILATERAL SCIATICA: ICD-10-CM

## 2025-03-10 DIAGNOSIS — M54.42 CHRONIC MIDLINE LOW BACK PAIN WITH BILATERAL SCIATICA: ICD-10-CM

## 2025-03-10 DIAGNOSIS — M25.50 POLYARTHRALGIA: ICD-10-CM

## 2025-03-10 RX ORDER — SULFASALAZINE 500 MG/1
1000 TABLET ORAL 2 TIMES DAILY
Qty: 120 TABLET | Refills: 0 | Status: SHIPPED | OUTPATIENT
Start: 2025-03-10 | End: 2025-04-09

## 2025-03-19 ENCOUNTER — OFFICE VISIT (OUTPATIENT)
Age: 70
End: 2025-03-19
Payer: OTHER GOVERNMENT

## 2025-03-19 VITALS
BODY MASS INDEX: 23.09 KG/M2 | HEART RATE: 78 BPM | OXYGEN SATURATION: 98 % | SYSTOLIC BLOOD PRESSURE: 140 MMHG | WEIGHT: 179.9 LBS | HEIGHT: 74 IN | DIASTOLIC BLOOD PRESSURE: 68 MMHG

## 2025-03-19 DIAGNOSIS — M54.42 CHRONIC MIDLINE LOW BACK PAIN WITH BILATERAL SCIATICA: Primary | ICD-10-CM

## 2025-03-19 DIAGNOSIS — G89.29 CHRONIC MIDLINE LOW BACK PAIN WITH BILATERAL SCIATICA: Primary | ICD-10-CM

## 2025-03-19 DIAGNOSIS — M54.41 CHRONIC MIDLINE LOW BACK PAIN WITH BILATERAL SCIATICA: Primary | ICD-10-CM

## 2025-03-19 DIAGNOSIS — R53.83 OTHER FATIGUE: ICD-10-CM

## 2025-03-19 DIAGNOSIS — M25.50 POLYARTHRALGIA: ICD-10-CM

## 2025-03-19 DIAGNOSIS — M72.0 DUPUYTREN CONTRACTURE: ICD-10-CM

## 2025-03-19 DIAGNOSIS — M19.90 INFLAMMATORY ARTHRITIS: ICD-10-CM

## 2025-03-19 PROCEDURE — 99214 OFFICE O/P EST MOD 30 MIN: CPT | Performed by: INTERNAL MEDICINE

## 2025-03-19 PROCEDURE — 99213 OFFICE O/P EST LOW 20 MIN: CPT | Performed by: INTERNAL MEDICINE

## 2025-03-19 PROCEDURE — 1123F ACP DISCUSS/DSCN MKR DOCD: CPT | Performed by: INTERNAL MEDICINE

## 2025-03-19 RX ORDER — GABAPENTIN 300 MG/1
CAPSULE ORAL
COMMUNITY
Start: 2025-03-03

## 2025-03-19 RX ORDER — ALIROCUMAB 150 MG/ML
150 INJECTION, SOLUTION SUBCUTANEOUS
COMMUNITY
Start: 2025-01-14

## 2025-03-19 ASSESSMENT — ENCOUNTER SYMPTOMS
RESPIRATORY NEGATIVE: 1
GASTROINTESTINAL NEGATIVE: 1
EYES NEGATIVE: 1

## 2025-03-19 NOTE — PROGRESS NOTES
Nasal route daily as needed, Disp: , Rfl:     tadalafil (CIALIS) 5 MG tablet, Take 1 tab (5 mg) twice daily., Disp: , Rfl:     Ascorbic Acid (VITAMIN C PO), Take 2,000 mg by mouth, Disp: , Rfl:     ibuprofen (ADVIL;MOTRIN) 800 MG tablet, Take 1 tablet by mouth every 8 hours as needed for Pain, Disp: , Rfl:     doxycycline (PERIOSTAT) 20 MG tablet, Take 1 tablet by mouth 2 times daily, Disp: , Rfl:     pantoprazole (PROTONIX) 40 MG tablet, Take 1 tablet by mouth daily, Disp: , Rfl:     aspirin 81 MG tablet, Take 1 tablet by mouth daily, Disp: , Rfl:       Objective     BP (!) 140/68 (BP Site: Right Upper Arm, Patient Position: Sitting, BP Cuff Size: Medium Adult)   Pulse 78   Ht 1.88 m (6' 2.02\")   Wt 81.6 kg (179 lb 14.4 oz)   SpO2 98%   BMI 23.09 kg/m²     Physical Exam  Vitals reviewed.   Constitutional:       Appearance: Normal appearance.   HENT:      Head: Normocephalic.      Nose: Nose normal.   Eyes:      Pupils: Pupils are equal, round, and reactive to light.   Cardiovascular:      Rate and Rhythm: Normal rate.      Heart sounds: No murmur heard.  Pulmonary:      Effort: Pulmonary effort is normal.      Breath sounds: Normal breath sounds.   Musculoskeletal:         General: Tenderness present. No swelling.   Skin:     General: Skin is warm and dry.      Comments: kylonchia   Neurological:      Mental Status: He is alert.      Deep Tendon Reflexes: Reflexes normal.       Musculoskeletal:  SHOULDERS -  ac joint hypertrophy -right   ELBOWS - non-tender   WRISTS - Non-tender   Hands - inability to make a complete fist on left    - tender at flexion tendon of finger left palmar aspect.     HIPS      - limited ROM bilateral   FEET :    Non-tender   SPINE:   non-tender ,  anupama, thoracic kyphosis           LABS        CBC  Lab Results   Component Value Date/Time    WBC 7.6 03/07/2025 02:45 PM    RBC 4.49 03/07/2025 02:45 PM    HGB 13.7 03/07/2025 02:45 PM    HCT 42.1 03/07/2025 02:45 PM    MCV 93.8

## 2025-03-20 LAB
QUANTI TB GOLD PLUS: NEGATIVE
QUANTI TB1 MINUS NIL: 0.01
QUANTI TB2 MINUS NIL: 0
QUANTIFERON MITOGEN: 7.73
QUANTIFERON NIL: 0.02

## 2025-03-24 ENCOUNTER — RESULTS FOLLOW-UP (OUTPATIENT)
Age: 70
End: 2025-03-24

## 2025-03-25 DIAGNOSIS — M19.90 INFLAMMATORY ARTHRITIS: ICD-10-CM

## 2025-03-25 DIAGNOSIS — M54.42 CHRONIC MIDLINE LOW BACK PAIN WITH BILATERAL SCIATICA: Primary | ICD-10-CM

## 2025-03-25 DIAGNOSIS — M54.41 CHRONIC MIDLINE LOW BACK PAIN WITH BILATERAL SCIATICA: Primary | ICD-10-CM

## 2025-03-25 DIAGNOSIS — M46.90 AXIAL SPONDYLOARTHRITIS: ICD-10-CM

## 2025-03-25 DIAGNOSIS — G89.29 CHRONIC MIDLINE LOW BACK PAIN WITH BILATERAL SCIATICA: Primary | ICD-10-CM

## 2025-03-25 RX ORDER — SECUKINUMAB 150 MG/ML
150 INJECTION SUBCUTANEOUS
Qty: 1 ML | Refills: 4 | Status: ACTIVE | OUTPATIENT
Start: 2025-03-25

## 2025-03-25 RX ORDER — SECUKINUMAB 150 MG/ML
150 INJECTION SUBCUTANEOUS WEEKLY
Qty: 4 ML | Refills: 0 | Status: ACTIVE | OUTPATIENT
Start: 2025-03-25 | End: 2025-04-16

## 2025-03-25 NOTE — PROGRESS NOTES
Diagnosis Orders   1. Chronic midline low back pain with bilateral sciatica        2. Inflammatory arthritis        3. Axial spondyloarthritis  secukinumab (COSENTYX SENSOREADY PEN) 150 MG/ML SOAJ    secukinumab (COSENTYX SENSOREADY PEN) 150 MG/ML SOAJ         Start cosentyx 150mg weekly x 4 weeks then change to every 4 weeks

## 2025-03-26 ENCOUNTER — TELEPHONE (OUTPATIENT)
Age: 70
End: 2025-03-26

## 2025-03-26 NOTE — TELEPHONE ENCOUNTER
Received call from Alex with [facility] stating [facility]. Per Alex, first patient needs to try a TNFI, Hadlima is the preferred for [facility]; Next step would be Taltz.   Patient needs to try both Hadlima and Taltz, then can revisit trying Cosentyx. Patient might need to try Rinvoq but Alex is not sure.     Alex did not see any malignancy history and did not see anything indicating that patient can not try the Hadlima and Taltz. Please advise. Thank you.     Cb: [phone] ex [phone]  Alex

## 2025-04-04 RX ORDER — EMPAGLIFLOZIN 25 MG/1
25 TABLET, FILM COATED ORAL DAILY
Qty: 30 TABLET | Refills: 5 | Status: SHIPPED | OUTPATIENT
Start: 2025-04-04

## 2025-06-03 ENCOUNTER — CLINICAL DOCUMENTATION (OUTPATIENT)
Age: 70
End: 2025-06-03

## 2025-06-03 ENCOUNTER — OFFICE VISIT (OUTPATIENT)
Age: 70
End: 2025-06-03
Payer: OTHER GOVERNMENT

## 2025-06-03 VITALS
HEIGHT: 74 IN | BODY MASS INDEX: 23.05 KG/M2 | HEART RATE: 66 BPM | WEIGHT: 179.6 LBS | SYSTOLIC BLOOD PRESSURE: 124 MMHG | DIASTOLIC BLOOD PRESSURE: 80 MMHG

## 2025-06-03 DIAGNOSIS — E11.65 TYPE 2 DIABETES MELLITUS WITH HYPERGLYCEMIA, WITH LONG-TERM CURRENT USE OF INSULIN (HCC): Primary | ICD-10-CM

## 2025-06-03 DIAGNOSIS — E78.2 MIXED HYPERLIPIDEMIA: ICD-10-CM

## 2025-06-03 DIAGNOSIS — Z79.4 TYPE 2 DIABETES MELLITUS WITH HYPERGLYCEMIA, WITH LONG-TERM CURRENT USE OF INSULIN (HCC): Primary | ICD-10-CM

## 2025-06-03 DIAGNOSIS — Z13.9 SCREENING DUE: Primary | ICD-10-CM

## 2025-06-03 PROCEDURE — 99214 OFFICE O/P EST MOD 30 MIN: CPT | Performed by: INTERNAL MEDICINE

## 2025-06-03 PROCEDURE — 1123F ACP DISCUSS/DSCN MKR DOCD: CPT | Performed by: INTERNAL MEDICINE

## 2025-06-03 RX ORDER — FLUCONAZOLE 150 MG/1
150 TABLET ORAL 2 TIMES DAILY PRN
COMMUNITY
Start: 2025-05-19

## 2025-06-03 RX ORDER — REPAGLINIDE 0.5 MG/1
TABLET ORAL
Qty: 90 TABLET | Refills: 1 | Status: SHIPPED | OUTPATIENT
Start: 2025-06-03

## 2025-06-03 RX ORDER — EVOLOCUMAB 140 MG/ML
INJECTION, SOLUTION SUBCUTANEOUS
COMMUNITY
End: 2025-06-03 | Stop reason: CLARIF

## 2025-06-03 RX ORDER — EMPAGLIFLOZIN 25 MG/1
25 TABLET, FILM COATED ORAL DAILY
Qty: 90 TABLET | Refills: 1 | Status: SHIPPED | OUTPATIENT
Start: 2025-06-03

## 2025-06-03 RX ORDER — ACYCLOVIR 400 MG/1
TABLET ORAL
Qty: 9 EACH | Refills: 3 | Status: SHIPPED | OUTPATIENT
Start: 2025-06-03

## 2025-06-03 RX ORDER — GABAPENTIN 800 MG/1
TABLET ORAL
COMMUNITY
Start: 2025-04-09

## 2025-06-03 NOTE — PROGRESS NOTES
Segundo Rahman is a 69 y.o. , male who comes for Initial visit for Diabetes Mellitus Type 2  PCP: Amy Luna, RN    HPI   This patient was diagnosed with diabetes mellitus 13 years ago.   Current therapy includes LANTUS 30 units  AND metformin 1000/1000 and jardiance 25 mg daily    Nutritional plan:  portion control and restriction of carbs  The patient is s/p back surgery for spinal stenosis which was done in January. He is still dealing with a lot of pain and some weakness but overall feels better. He is eating better.   He checks blood sugars using dexcom CGMS. Average blood sugar is 162 mg/dL, WITH GMI of 7.2 and TIR OF 75%.  HE DENIES POLYURIA, POLYDIPSIA AND VISUAL BLURINESS.  He is not current on eye exam, and He reports no foot ulcers or infections.   Patient isbeing treated for hypercholesterolemia.  Patient is being treated for  hypertension.  Past Medical History:   Diagnosis Date    Diabetes mellitus (HCC)     Pancreatitis     Polyarthralgia       Past Surgical History:   Procedure Laterality Date    NECK SURGERY  2024    SPINE SURGERY  2025    L2-L3       Family History   Problem Relation Age of Onset    Stroke Mother     Coronary Art Dis Father      Social History     Tobacco Use    Smoking status: Former     Current packs/day: 0.00     Types: Cigarettes     Quit date: 2013     Years since quittin.7    Smokeless tobacco: Never   Substance Use Topics    Alcohol use: Yes     Comment: Socially      Current Outpatient Medications   Medication Sig Dispense Refill    Adalimumab (HUMIRA PEN SC) Inject into the skin 40mg/0.4 mL, every 2 weeks      fluconazole (DIFLUCAN) 150 MG tablet Take 1 tablet by mouth 2 times daily as needed      gabapentin (NEURONTIN) 800 MG tablet 1 TABLET ORALLY THREE TIMES ADAY FOR 14 DAYS,THE MAXIMUM DAILY DOSE IS 3 TABLET (Patient taking differently: Twice daily)      Continuous Glucose Sensor (DEXCOM G7 SENSOR) MISC Check blood sugars 4x/day 9 each 3

## 2025-07-01 DIAGNOSIS — Z79.4 TYPE 2 DIABETES MELLITUS WITH HYPERGLYCEMIA, WITH LONG-TERM CURRENT USE OF INSULIN (HCC): ICD-10-CM

## 2025-07-01 DIAGNOSIS — E11.65 TYPE 2 DIABETES MELLITUS WITH HYPERGLYCEMIA, WITH LONG-TERM CURRENT USE OF INSULIN (HCC): ICD-10-CM

## 2025-07-01 RX ORDER — REPAGLINIDE 0.5 MG/1
TABLET ORAL
Qty: 90 TABLET | Refills: 1 | Status: SHIPPED | OUTPATIENT
Start: 2025-07-01

## 2025-07-02 LAB
ALBUMIN: 4.6 G/DL
ALP BLD-CCNC: 52 U/L
ALT SERPL-CCNC: 13 U/L
ANION GAP SERPL CALCULATED.3IONS-SCNC: ABNORMAL MMOL/L
AST SERPL-CCNC: 16 U/L
BILIRUB SERPL-MCNC: 0.5 MG/DL (ref 0.1–1.4)
BUN BLDV-MCNC: 28 MG/DL
C-PEPTIDE: 1.64
CALCIUM SERPL-MCNC: 9.5 MG/DL
CHLORIDE BLD-SCNC: 105 MMOL/L
CHOLESTEROL, TOTAL: 117 MG/DL
CHOLESTEROL/HDL RATIO: 2.2
CO2: 27 MMOL/L
CREAT SERPL-MCNC: 0.64 MG/DL
CREATININE URINE: 56 MG/DL
ESTIMATED AVERAGE GLUCOSE: ABNORMAL
GFR, ESTIMATED: 101
GLUCOSE BLD-MCNC: 164 MG/DL
HBA1C MFR BLD: 7.5 %
HDLC SERPL-MCNC: 53 MG/DL (ref 35–70)
LDL CHOLESTEROL: 44
MICROALBUMIN/CREAT 24H UR: 0.5 MG/DL
MICROALBUMIN/CREAT UR-RTO: 9 MG/G
NONHDLC SERPL-MCNC: 64 MG/DL
POTASSIUM SERPL-SCNC: 4.3 MMOL/L
PROSTATE SPECIFIC ANTIGEN: 1.54 NG/ML
SODIUM BLD-SCNC: 141 MMOL/L
TOTAL PROTEIN: 7 G/DL (ref 6.4–8.2)
TRIGL SERPL-MCNC: 116 MG/DL
VLDLC SERPL CALC-MCNC: NORMAL MG/DL

## 2025-07-06 ENCOUNTER — RESULTS FOLLOW-UP (OUTPATIENT)
Age: 70
End: 2025-07-06

## 2025-07-06 NOTE — RESULT ENCOUNTER NOTE
Segundo Rahman  lab test(s) were abnormal.  A1c is worse.  Most likely pain related.  I like to see more blood sugars in a month.  Please contact patient and document response.